# Patient Record
Sex: FEMALE | Employment: UNEMPLOYED | URBAN - METROPOLITAN AREA
[De-identification: names, ages, dates, MRNs, and addresses within clinical notes are randomized per-mention and may not be internally consistent; named-entity substitution may affect disease eponyms.]

---

## 2019-08-31 ENCOUNTER — OFFICE VISIT (OUTPATIENT)
Dept: URGENT CARE | Facility: CLINIC | Age: 49
End: 2019-08-31
Payer: COMMERCIAL

## 2019-08-31 VITALS
TEMPERATURE: 98.3 F | WEIGHT: 182.2 LBS | HEART RATE: 90 BPM | RESPIRATION RATE: 18 BRPM | HEIGHT: 62 IN | SYSTOLIC BLOOD PRESSURE: 132 MMHG | BODY MASS INDEX: 33.53 KG/M2 | OXYGEN SATURATION: 100 % | DIASTOLIC BLOOD PRESSURE: 73 MMHG

## 2019-08-31 DIAGNOSIS — J22 LOWER RESPIRATORY INFECTION: ICD-10-CM

## 2019-08-31 DIAGNOSIS — J45.21 MILD INTERMITTENT ASTHMATIC BRONCHITIS WITH ACUTE EXACERBATION: Primary | ICD-10-CM

## 2019-08-31 PROBLEM — J45.20 MILD INTERMITTENT ASTHMA: Status: ACTIVE | Noted: 2019-08-31

## 2019-08-31 PROCEDURE — 99202 OFFICE O/P NEW SF 15 MIN: CPT | Performed by: PHYSICIAN ASSISTANT

## 2019-08-31 RX ORDER — ALBUTEROL SULFATE 2.5 MG/3ML
2.5 SOLUTION RESPIRATORY (INHALATION) EVERY 6 HOURS PRN
COMMUNITY
End: 2019-08-31 | Stop reason: ALTCHOICE

## 2019-08-31 RX ORDER — PREDNISONE 50 MG/1
50 TABLET ORAL DAILY
Qty: 5 TABLET | Refills: 0 | Status: SHIPPED | OUTPATIENT
Start: 2019-08-31 | End: 2019-09-05

## 2019-08-31 RX ORDER — AZITHROMYCIN 250 MG/1
TABLET, FILM COATED ORAL
Qty: 6 TABLET | Refills: 0 | Status: SHIPPED | OUTPATIENT
Start: 2019-08-31 | End: 2019-09-05

## 2019-08-31 RX ORDER — ALBUTEROL SULFATE 2.5 MG/3ML
2.5 SOLUTION RESPIRATORY (INHALATION) EVERY 6 HOURS PRN
Qty: 30 ML | Refills: 0 | Status: SHIPPED | OUTPATIENT
Start: 2019-08-31 | End: 2019-09-05 | Stop reason: SDUPTHER

## 2019-08-31 NOTE — PATIENT INSTRUCTIONS
Take the antibiotic as directed with food and water  Take a probiotic while taking this medication  Take steroid as directed with food and water  While on this medication do not take any NSAIDs including ibuprofen (Advil), naproxen (Aleve), etc   Avoid caffeinated beverages while taking this medication  Continue to use your inhaler and nebulizer treatments as previously directed  Steam showers and a humidifier at bedtime may be relieving of cough and discomfort  Saltwater gargles, warm tea with honey, and throat lozenges may be relieving of throat discomfort  Follow up with your family doctor in 3-5 days if symptoms persist   Proceed to the ER if symptoms worsen

## 2019-08-31 NOTE — PROGRESS NOTES
[facility]        NAME: Darwin Mock is a 54 y o  female  : 1970    MRN: [id number]  DATE: 2019  TIME: 11:37 AM    Assessment and Plan   Mild intermittent asthmatic bronchitis with acute exacerbation [J45 21]  1  Mild intermittent asthmatic bronchitis with acute exacerbation  albuterol (2 5 mg/3 mL) 0 083 % nebulizer solution    predniSONE 50 mg tablet   2  Lower respiratory infection  azithromycin (ZITHROMAX) 250 mg tablet     Patient Instructions     Take the antibiotic as directed with food and water  Take a probiotic while taking this medication  Take steroid as directed with food and water  While on this medication do not take any NSAIDs including ibuprofen (Advil), naproxen (Aleve), etc   Avoid caffeinated beverages while taking this medication  Continue to use your inhaler and nebulizer treatments as previously directed  Steam showers and a humidifier at bedtime may be relieving of cough and discomfort  Saltwater gargles, warm tea with honey, and throat lozenges may be relieving of throat discomfort  Follow up with your family doctor in 3-5 days if symptoms persist   Proceed to the ER if symptoms worsen  Discussed with patient that history and physical are consistent with asthmatic bronchitis  Will cover with an antibiotic out of concern for bacterial infection due to duration of symptoms, new onset cough production with rhonchi on exam   The diagnosis, etiology, expected course of illness, and treatment plan were reviewed  All questions answered  Precautions given  Patient verbalized understanding and agreement with the treatment plan  Chief Complaint     Chief Complaint   Patient presents with    Cough     4  day duration has history of asthma has used inhaler, nebulizer, robitussin, musines, nyquill, day quill ibuprophen   non productive cough      History of Present Illness     28-year-old female presenting with complaint of cough x 8 days   She notes symptoms of fatigue, body aches, intermittently cough and SOB  SOB described as sensation of inability to take a deep breath, primarily with lying down, and WELLS  No CP or wheezing  She has attempted treating with her albuterol inhaler, 2 puffs, 4-5 x daily and with her albuterol nebulizer nightly before bed with little relief  She has also treated with Robitussin, Mucinex, NyQuil/DayQuil, and ibuprofen without relief  Feels symptoms are worsening  No F/C/S, NC, runny nose, sore throat, ear pain, or current GI sx  She was sick with sharp epigastric abdominal pains and intermittent diarrhea the week prior to onset of cough, but reports resolution of this after treating with pepto bismol  No sick contacts  Vacation 1 month ago to [facility], otherwise no recent travel  Nonsmoker  Unsure of asthma type noting flares with season change  Does tx with zyrtec and flonase for seasonal allergies  Review of Systems   Review of Systems   Constitutional: Positive for fatigue  Negative for chills, diaphoresis and fever  HENT: Negative for congestion, ear pain, postnasal drip, rhinorrhea and sore throat  Respiratory: Positive for cough and shortness of breath  Negative for wheezing  Cardiovascular: Negative for chest pain and palpitations  Gastrointestinal: Negative for abdominal pain, diarrhea, nausea and vomiting  Musculoskeletal: Positive for arthralgias and myalgias  Skin: Negative for rash  Neurological: Positive for headaches (with coughing)  Negative for dizziness       Current Medications       Current Outpatient Medications:     Albuterol Sulfate 108 (90 Base) MCG/ACT AEPB, Inhale 2 puffs every 4 (four) hours as needed for wheezing or shortness of breath, Disp: , Rfl:     albuterol (2 5 mg/3 mL) 0 083 % nebulizer solution, Take 1 vial (2 5 mg total) by nebulization every 6 (six) hours as needed for wheezing or shortness of breath, Disp: 30 mL, Rfl: 0    azithromycin (ZITHROMAX) 250 mg tablet, Take two tablets on day one, then one tablet daily  , Disp: 6 tablet, Rfl: 0    predniSONE 50 mg tablet, Take 1 tablet (50 mg total) by mouth daily for 5 days, Disp: 5 tablet, Rfl: 0    Current Allergies     Allergies as of 08/31/2019    (No Known Allergies)            The following portions of the patient's history were reviewed and updated as appropriate: allergies, current medications, past family history, past medical history, past social history, past surgical history and problem list      Past Medical History:   Diagnosis Date    Asthma        Past Surgical History:   Procedure Laterality Date    ANKLE LIGAMENT RECONSTRUCTION      APPENDECTOMY      AUGMENTATION BREAST      KNEE ARTHROSCOPY W/ MENISCAL REPAIR      ORTHOPEDIC SURGERY      SPINAL FUSION      [address]         History reviewed  No pertinent family history  Medications have been verified  Objective   /73   Pulse 90   Temp 98 3 °F (36 8 °C)   Resp 18   Ht 5' 2" (1 575 m)   Wt 82 6 kg (182 lb 3 2 oz)   LMP 08/31/2019   SpO2 100%   BMI 33 32 kg/m²      Physical Exam     Physical Exam   Constitutional: She is oriented to person, place, and time  Vital signs are normal  She appears well-developed and well-nourished  She is cooperative  She appears ill  No distress  HENT:   Head: Normocephalic and atraumatic  Right Ear: Hearing, tympanic membrane, external ear and ear canal normal  No middle ear effusion  Left Ear: Hearing, tympanic membrane, external ear and ear canal normal   No middle ear effusion  Nose: Nose normal  No mucosal edema or rhinorrhea  Mouth/Throat: Uvula is midline and mucous membranes are normal  Mucous membranes are not pale, not dry and not cyanotic  No oral lesions  No uvula swelling  Posterior oropharyngeal erythema (mild) present  No oropharyngeal exudate, posterior oropharyngeal edema or tonsillar abscesses  Tonsils are 1+ on the right  Tonsils are 1+ on the left  No tonsillar exudate  Eyes: Conjunctivae and lids are normal  Right eye exhibits no discharge and no exudate  Left eye exhibits no discharge and no exudate  Neck: Trachea normal and phonation normal  Neck supple  No tracheal tenderness present  No neck rigidity  No edema and no erythema present  Cardiovascular: Normal rate, regular rhythm and normal heart sounds  Exam reveals no gallop, no distant heart sounds and no friction rub  No murmur heard  Pulmonary/Chest: Effort normal and breath sounds normal  No stridor  No respiratory distress  She has no rales  Coarse and decreased breath sounds throughout  Expiratory wheezing in b/l bases with coarse rhonchi of the LLL that clears with coughing  Abdominal: Soft  Bowel sounds are normal  She exhibits no distension and no mass  There is no tenderness  There is no rigidity, no rebound and no guarding  Lymphadenopathy:     She has no cervical adenopathy  Neurological: She is alert and oriented to person, place, and time  She is not disoriented  No cranial nerve deficit  She exhibits normal muscle tone  Coordination normal    Skin: Skin is warm, dry and intact  No rash noted  She is not diaphoretic  No erythema  No pallor  Psychiatric: She has a normal mood and affect  Her behavior is normal  Judgment and thought content normal    Nursing note and vitals reviewed

## 2019-09-05 ENCOUNTER — OFFICE VISIT (OUTPATIENT)
Dept: URGENT CARE | Facility: CLINIC | Age: 49
End: 2019-09-05
Payer: COMMERCIAL

## 2019-09-05 VITALS
HEART RATE: 72 BPM | TEMPERATURE: 98.3 F | BODY MASS INDEX: 33.49 KG/M2 | RESPIRATION RATE: 18 BRPM | SYSTOLIC BLOOD PRESSURE: 120 MMHG | DIASTOLIC BLOOD PRESSURE: 80 MMHG | WEIGHT: 182 LBS | OXYGEN SATURATION: 98 % | HEIGHT: 62 IN

## 2019-09-05 DIAGNOSIS — J45.21 MILD INTERMITTENT ASTHMATIC BRONCHITIS WITH ACUTE EXACERBATION: Primary | ICD-10-CM

## 2019-09-05 PROCEDURE — 99213 OFFICE O/P EST LOW 20 MIN: CPT | Performed by: PHYSICIAN ASSISTANT

## 2019-09-05 RX ORDER — BENZONATATE 200 MG/1
200 CAPSULE ORAL 3 TIMES DAILY PRN
Qty: 20 CAPSULE | Refills: 0 | Status: SHIPPED | OUTPATIENT
Start: 2019-09-05

## 2019-09-05 RX ORDER — DOXYCYCLINE 100 MG/1
100 CAPSULE ORAL 2 TIMES DAILY
Qty: 20 CAPSULE | Refills: 0 | Status: SHIPPED | OUTPATIENT
Start: 2019-09-05 | End: 2019-09-15

## 2019-09-05 RX ORDER — CETIRIZINE HYDROCHLORIDE 10 MG/1
10 TABLET ORAL DAILY PRN
COMMUNITY

## 2019-09-05 RX ORDER — ALBUTEROL SULFATE 2.5 MG/3ML
2.5 SOLUTION RESPIRATORY (INHALATION) EVERY 6 HOURS PRN
Qty: 30 ML | Refills: 0 | Status: SHIPPED | OUTPATIENT
Start: 2019-09-05

## 2019-09-05 RX ORDER — PREDNISONE 10 MG/1
TABLET ORAL
Qty: 14 TABLET | Refills: 0 | Status: SHIPPED | OUTPATIENT
Start: 2019-09-05 | End: 2019-09-10

## 2019-09-05 NOTE — PATIENT INSTRUCTIONS
Acute Bronchitis   WHAT YOU NEED TO KNOW:   Acute bronchitis is swelling and irritation in the air passages of your lungs  This irritation may cause you to cough or have other breathing problems  Acute bronchitis often starts because of another illness, such as a cold or the flu  The illness spreads from your nose and throat to your windpipe and airways  Bronchitis is often called a chest cold  Acute bronchitis lasts about 3 to 6 weeks and is usually not a serious illness  Your cough can last for several weeks  DISCHARGE INSTRUCTIONS:   Return to the emergency department if:   · You cough up blood  · Your lips or fingernails turn blue  · You feel like you are not getting enough air when you breathe  Contact your healthcare provider if:   · You have a fever  · Your breathing problems do not go away or get worse  · Your cough does not get better within 4 weeks  · You have questions or concerns about your condition or care  Self-care:   · Get more rest   Rest helps your body to heal  Slowly start to do more each day  Rest when you feel it is needed  · Avoid irritants in the air  Avoid chemicals, fumes, and dust  Wear a face mask if you must work around dust or fumes  Stay inside on days when air pollution levels are high  If you have allergies, stay inside when pollen counts are high  Do not use aerosol products, such as spray-on deodorant, bug spray, and hair spray  · Do not smoke or be around others who smoke  Nicotine and other chemicals in cigarettes and cigars damages the cilia that move mucus out of your lungs  Ask your healthcare provider for information if you currently smoke and need help to quit  E-cigarettes or smokeless tobacco still contain nicotine  Talk to your healthcare provider before you use these products  · Drink liquids as directed  Liquids help keep your air passages moist and help you cough up mucus   You may need to drink more liquids when you have acute bronchitis  Ask how much liquid to drink each day and which liquids are best for you  · Use a humidifier or vaporizer  Use a cool mist humidifier or a vaporizer to increase air moisture in your home  This may make it easier for you to breathe and help decrease your cough  Decrease risk for acute bronchitis:   · Get the vaccinations you need  Ask your healthcare provider if you should get vaccinated against the flu or pneumonia  · Prevent the spread of germs  You can decrease your risk of acute bronchitis and other illnesses by doing the following:     [facility] your hands often with soap and water  Carry germ-killing hand lotion or gel with you  You can use the lotion or gel to clean your hands when soap and water are not available  ¨ Do not touch your eyes, nose, or mouth unless you have washed your hands first     ¨ Always cover your mouth when you cough to prevent the spread of germs  It is best to cough into a tissue or your shirt sleeve instead of into your hand  Ask those around you cover their mouths when they cough  ¨ Try to avoid people who have a cold or the flu  If you are sick, stay away from others as much as possible  Medicines: Your healthcare provider may  give you any of the following:  · Ibuprofen or acetaminophen  are medicines that help lower your fever  They are available without a doctor's order  Ask your healthcare provider which medicine is right for you  Ask how much to take and how often to take it  Follow directions  These medicines can cause stomach bleeding if not taken correctly  Ibuprofen can cause kidney damage  Do not take ibuprofen if you have kidney disease, an ulcer, or allergies to aspirin  Acetaminophen can cause liver damage  Do not take more than 4,000 milligrams in 24 hours  · Decongestants  help loosen mucus in your lungs and make it easier to cough up  This can help you breathe easier  · Cough suppressants  decrease your urge to cough   If your cough produces mucus, do not take a cough suppressant unless your healthcare provider tells you to  Your healthcare provider may suggest that you take a cough suppressant at night so you can rest     · Inhalers  may be given  Your healthcare provider may give you one or more inhalers to help you breathe easier and cough less  An inhaler gives your medicine to open your airways  Ask your healthcare provider to show you how to use your inhaler correctly  · Take your medicine as directed  Contact your healthcare provider if you think your medicine is not helping or if you have side effects  Tell him of her if you are allergic to any medicine  Keep a list of the medicines, vitamins, and herbs you take  Include the amounts, and when and why you take them  Bring the list or the pill bottles to follow-up visits  Carry your medicine list with you in case of an emergency  Follow up with your healthcare provider as directed:  Write down questions you have so you will remember to ask them during your follow-up visits  © 2017 [employer] Information is for End User's use only and may not be sold, redistributed or otherwise used for commercial purposes  All illustrations and images included in CareNotes® are the copyrighted property of [employer]  or [employer]  The above information is an  only  It is not intended as medical advice for individual conditions or treatments  Talk to your doctor, nurse or pharmacist before following any medical regimen to see if it is safe and effective for you

## 2019-09-05 NOTE — PROGRESS NOTES
[facility]        NAME: Griselda Rivera is a 54 y o  female  : 1970    MRN: [id number]  DATE: September 10, 2019  TIME: 10:27 AM    Assessment and Plan   Mild intermittent asthmatic bronchitis with acute exacerbation [J45 21]  1  Mild intermittent asthmatic bronchitis with acute exacerbation  benzonatate (TESSALON) 200 MG capsule    doxycycline monohydrate (MONODOX) 100 mg capsule    predniSONE 10 mg tablet    albuterol (2 5 mg/3 mL) 0 083 % nebulizer solution         Patient Instructions     Discussed condition with patient  She has persistent symptoms of bronchitis which have not improved with previous treatment regimen  I will treat her with a combination of doxycycline as well as an oral prednisone taper to come down off the high-dose burst she was given and will also refill her albuterol neb solution and prescribe her Tessalon Perles as needed to control the cough  I recommend hydration, rest, and observation  Follow up with PCP in 3-5 days  Proceed to  ER if symptoms worsen  Chief Complaint     Chief Complaint   Patient presents with    Cough     f/u from 19  [place] 5 day course of Prdnisone yesterday  Continues to have a worsening cough - no wproductive but has spasms  Has chest tightness and WELLS  Using nebulizer BID and HFA q 4 hours in between  No fever  History of Present Illness       Pt presents for reevaluation from previous visit on 2019  She finished Z-pack and Prednisone but does not feel that she has improved significantly  She reports a harder time catching her breath, productive cough, SOB, chest tightness and congestion  She has been using nebulizer and rescue inhaler PRN  Reports OTC cough meds have not been effective  Has asthma but no allergies  Does not smoke  Review of Systems   Review of Systems   Constitutional: Negative  HENT: Negative  Respiratory: Positive for cough, chest tightness and shortness of breath      Cardiovascular: Negative  Gastrointestinal: Negative  Genitourinary: Negative  Current Medications       Current Outpatient Medications:     albuterol (2 5 mg/3 mL) 0 083 % nebulizer solution, Take 1 vial (2 5 mg total) by nebulization every 6 (six) hours as needed for wheezing or shortness of breath, Disp: 30 mL, Rfl: 0    Albuterol Sulfate 108 (90 Base) MCG/ACT AEPB, Inhale 2 puffs every 4 (four) hours as needed for wheezing or shortness of breath, Disp: , Rfl:     cetirizine (ZyrTEC) 10 mg tablet, Take 10 mg by mouth daily as needed for allergies, Disp: , Rfl:     benzonatate (TESSALON) 200 MG capsule, Take 1 capsule (200 mg total) by mouth 3 (three) times a day as needed for cough, Disp: 20 capsule, Rfl: 0    doxycycline monohydrate (MONODOX) 100 mg capsule, Take 1 capsule (100 mg total) by mouth 2 (two) times a day for 10 days Take with food (non-dairy)  , Disp: 20 capsule, Rfl: 0    predniSONE 10 mg tablet, 4-4-3-2-1 taper with food  , Disp: 14 tablet, Rfl: 0    Current Allergies     Allergies as of 09/05/2019    (No Known Allergies)            The following portions of the patient's history were reviewed and updated as appropriate: allergies, current medications, past family history, past medical history, past social history, past surgical history and problem list      Past Medical History:   Diagnosis Date    Allergic rhinitis     Asthma     Pneumonia        Past Surgical History:   Procedure Laterality Date    ANKLE LIGAMENT RECONSTRUCTION      APPENDECTOMY      AUGMENTATION BREAST      KNEE ARTHROSCOPY W/ MENISCAL REPAIR      ORTHOPEDIC SURGERY      SPINAL FUSION      TUBAL LIGATION  1996    [id number] [facility]         History reviewed  No pertinent family history  Medications have been verified          Objective   /80 (BP Location: Right arm, Patient Position: Sitting, Cuff Size: Standard)   Pulse 72   Temp 98 3 °F (36 8 °C) (Tympanic)   Resp 18   Ht 5' 2" (1 575 m) Wt 82 6 kg (182 lb)   LMP 08/31/2019   SpO2 98%   BMI 33 29 kg/m²        Physical Exam     Physical Exam   Constitutional: She is oriented to person, place, and time  She appears well-developed and well-nourished  No distress  HENT:   Right Ear: Hearing, tympanic membrane, external ear and ear canal normal    Left Ear: Hearing, tympanic membrane, external ear and ear canal normal    Nose: Nose normal    Mouth/Throat: Mucous membranes are normal  Posterior oropharyngeal erythema present  No oropharyngeal exudate  No tonsillar exudate  Neck: Neck supple  Cardiovascular: Normal rate, regular rhythm and normal heart sounds  No murmur heard  Pulmonary/Chest: She has decreased breath sounds  She has no wheezes  She has rhonchi (Bilateral diffuse coarse decreased breath sounds heard throughout)  Lymphadenopathy:     She has no cervical adenopathy  Neurological: She is alert and oriented to person, place, and time  Psychiatric: She has a normal mood and affect  Vitals reviewed

## 2019-09-05 NOTE — LETTER
September 5, 2019     Patient: Malcolm Garcia   YOB: 1970   Date of Visit: 9/5/2019       To Whom it May Concern:    Malcolm Garcia was seen in my clinic on 9/5/2019  She may return to work on 9/7/2019  If you have any questions or concerns, please don't hesitate to call           Sincerely,          yLle Rajan PA-C        CC: No Recipients